# Patient Record
Sex: MALE | Race: WHITE | URBAN - METROPOLITAN AREA
[De-identification: names, ages, dates, MRNs, and addresses within clinical notes are randomized per-mention and may not be internally consistent; named-entity substitution may affect disease eponyms.]

---

## 2017-04-25 ENCOUNTER — OFFICE (OUTPATIENT)
Dept: URBAN - METROPOLITAN AREA CLINIC 78 | Facility: CLINIC | Age: 82
End: 2017-04-25
Payer: COMMERCIAL

## 2017-04-25 VITALS
TEMPERATURE: 98.8 F | DIASTOLIC BLOOD PRESSURE: 85 MMHG | SYSTOLIC BLOOD PRESSURE: 127 MMHG | WEIGHT: 161 LBS | HEART RATE: 71 BPM | HEIGHT: 68 IN

## 2017-04-25 DIAGNOSIS — Z86.010 PERSONAL HISTORY OF COLONIC POLYPS: ICD-10-CM

## 2017-04-25 DIAGNOSIS — Z80.0 FAMILY HISTORY OF MALIGNANT NEOPLASM OF DIGESTIVE ORGANS: ICD-10-CM

## 2017-04-25 DIAGNOSIS — K22.70 BARRETT'S ESOPHAGUS WITHOUT DYSPLASIA: ICD-10-CM

## 2017-04-25 PROCEDURE — 99214 OFFICE O/P EST MOD 30 MIN: CPT

## 2017-04-25 NOTE — SERVICEHPINOTES
Mr. Clements is here for follow up regarding GERD and hx of Ureña's.     He has a hx of Ureña's esophagus in 2003 and 2007 last EGD in 2011 showed no Ureña's esophagus, due for a surveillance EGD. He states he is doing well and Nexium works well for him. He denies GERD, dysphagia, anorexia, or early satiety. No current UGI complaints. No lower GI complaints except had some issues with his bowels during recent surgeries at [facility] but now back to his baseline. No blood in stool. No known heart issues.

## 2019-06-26 ENCOUNTER — OFFICE (OUTPATIENT)
Dept: URBAN - METROPOLITAN AREA CLINIC 78 | Facility: CLINIC | Age: 84
End: 2019-06-26
Payer: COMMERCIAL

## 2019-06-26 VITALS
DIASTOLIC BLOOD PRESSURE: 86 MMHG | TEMPERATURE: 97.9 F | SYSTOLIC BLOOD PRESSURE: 130 MMHG | HEIGHT: 68 IN | WEIGHT: 144 LBS | HEART RATE: 60 BPM

## 2019-06-26 DIAGNOSIS — D64.9 ANEMIA, UNSPECIFIED: ICD-10-CM

## 2019-06-26 DIAGNOSIS — Z86.010 PERSONAL HISTORY OF COLONIC POLYPS: ICD-10-CM

## 2019-06-26 DIAGNOSIS — K21.9 GASTRO-ESOPHAGEAL REFLUX DISEASE WITHOUT ESOPHAGITIS: ICD-10-CM

## 2019-06-26 DIAGNOSIS — K22.70 BARRETT'S ESOPHAGUS WITHOUT DYSPLASIA: ICD-10-CM

## 2019-06-26 DIAGNOSIS — Z80.0 FAMILY HISTORY OF MALIGNANT NEOPLASM OF DIGESTIVE ORGANS: ICD-10-CM

## 2019-06-26 PROCEDURE — 99214 OFFICE O/P EST MOD 30 MIN: CPT

## 2019-06-26 NOTE — SERVICEHPINOTES
Mr. Clements is here for follow up regarding Ureña's esophagus.   He has a hx of Ureña's esophagus in 2003 and 2007 last EGD in 2011 showed no Ureña's esophagus given a 5 yr recall. He denies chest pain, SOB, constipation, diarrhea, ab pain, rectal bleeding, and weight loss. CBC shows a hgb of 12.4, hct of 37.4, MCV of 102, and normal platelet count. No regular NSAID use.  He was scheduled for a colonoscopy last year but canceled it due to left sided hip issues/pain. He has been getting cortisone injections which last for 4-5 months. He continues to take Nexium well w/o issues. He has been on probiotics for about one year. He had a hyperplastic polyp removed about 12 years ago. His older sister had CRC. No UGI issues or alarm features. No known heart issues.

## 2024-07-15 ENCOUNTER — NEW PATIENT (OUTPATIENT)
Dept: URBAN - METROPOLITAN AREA CLINIC 66 | Facility: CLINIC | Age: 89
End: 2024-07-15

## 2024-07-15 DIAGNOSIS — H33.301: ICD-10-CM

## 2024-07-15 DIAGNOSIS — H27.131: ICD-10-CM

## 2024-07-15 DIAGNOSIS — H40.1133: ICD-10-CM

## 2024-07-15 DIAGNOSIS — H35.3132: ICD-10-CM

## 2024-07-15 PROCEDURE — 92201 OPSCPY EXTND RTA DRAW UNI/BI: CPT

## 2024-07-15 PROCEDURE — 92133 CPTRZD OPH DX IMG PST SGM ON: CPT | Mod: NC

## 2024-07-15 PROCEDURE — 92134 CPTRZ OPH DX IMG PST SGM RTA: CPT

## 2024-07-15 PROCEDURE — 99204 OFFICE O/P NEW MOD 45 MIN: CPT

## 2024-07-15 ASSESSMENT — VISUAL ACUITY
OD_PH: 20/50-1
OD_CC: 20/150
OS_CC: CF 5FT

## 2024-07-15 ASSESSMENT — TONOMETRY
OS_IOP_MMHG: 12
OD_IOP_MMHG: 26
OD_IOP_MMHG: 30